# Patient Record
(demographics unavailable — no encounter records)

---

## 2024-12-11 NOTE — ASSESSMENT
[FreeTextEntry1] : yury removed- reports he can hear but ha still not working brought to  to get HA adjusted by audiology rtc 10 mo with mri and

## 2024-12-11 NOTE — PHYSICAL EXAM
[Normal] : tympanic membranes are normal in both ears [de-identified] : r>l cerumen removed atraumatically with suction tms nl

## 2024-12-11 NOTE — HISTORY OF PRESENT ILLNESS
[de-identified] : 2 mo follow up appt for this 80 yo m ha user - reports that his HA was repaired AD and it failed again after 4d and hears better without it. He ha h/o r AN and cerumen impaction.